# Patient Record
Sex: MALE | Race: WHITE | NOT HISPANIC OR LATINO | Employment: OTHER | URBAN - METROPOLITAN AREA
[De-identification: names, ages, dates, MRNs, and addresses within clinical notes are randomized per-mention and may not be internally consistent; named-entity substitution may affect disease eponyms.]

---

## 2021-03-09 DIAGNOSIS — Z23 NEED FOR VACCINATION: ICD-10-CM

## 2023-05-08 ENCOUNTER — HOSPITAL ENCOUNTER (OUTPATIENT)
Dept: LAB | Facility: MEDICAL CENTER | Age: 83
End: 2023-05-08
Attending: PHYSICIAN ASSISTANT
Payer: MEDICARE

## 2023-05-08 LAB
ALBUMIN SERPL BCP-MCNC: 4.2 G/DL (ref 3.2–4.9)
ALBUMIN/GLOB SERPL: 1.3 G/DL
ALP SERPL-CCNC: 87 U/L (ref 30–99)
ALT SERPL-CCNC: 11 U/L (ref 2–50)
ANION GAP SERPL CALC-SCNC: 12 MMOL/L (ref 7–16)
AST SERPL-CCNC: 19 U/L (ref 12–45)
BILIRUB SERPL-MCNC: 0.5 MG/DL (ref 0.1–1.5)
BUN SERPL-MCNC: 20 MG/DL (ref 8–22)
CALCIUM ALBUM COR SERPL-MCNC: 9.1 MG/DL (ref 8.5–10.5)
CALCIUM SERPL-MCNC: 9.3 MG/DL (ref 8.5–10.5)
CHLORIDE SERPL-SCNC: 105 MMOL/L (ref 96–112)
CHOLEST SERPL-MCNC: 146 MG/DL (ref 100–199)
CO2 SERPL-SCNC: 24 MMOL/L (ref 20–33)
CREAT SERPL-MCNC: 1 MG/DL (ref 0.5–1.4)
EST. AVERAGE GLUCOSE BLD GHB EST-MCNC: 123 MG/DL
FASTING STATUS PATIENT QL REPORTED: NORMAL
GFR SERPLBLD CREATININE-BSD FMLA CKD-EPI: 74 ML/MIN/1.73 M 2
GLOBULIN SER CALC-MCNC: 3.2 G/DL (ref 1.9–3.5)
GLUCOSE SERPL-MCNC: 95 MG/DL (ref 65–99)
HBA1C MFR BLD: 5.9 % (ref 4–5.6)
HDLC SERPL-MCNC: 33 MG/DL
LDLC SERPL CALC-MCNC: 92 MG/DL
POTASSIUM SERPL-SCNC: 4.1 MMOL/L (ref 3.6–5.5)
PROT SERPL-MCNC: 7.4 G/DL (ref 6–8.2)
SODIUM SERPL-SCNC: 141 MMOL/L (ref 135–145)
TRIGL SERPL-MCNC: 104 MG/DL (ref 0–149)

## 2023-05-08 PROCEDURE — 80053 COMPREHEN METABOLIC PANEL: CPT

## 2023-05-08 PROCEDURE — 80061 LIPID PANEL: CPT

## 2023-05-08 PROCEDURE — 83036 HEMOGLOBIN GLYCOSYLATED A1C: CPT | Mod: GA

## 2023-05-08 PROCEDURE — 36415 COLL VENOUS BLD VENIPUNCTURE: CPT | Mod: GA

## 2023-12-26 ENCOUNTER — HOSPITAL ENCOUNTER (OUTPATIENT)
Facility: MEDICAL CENTER | Age: 83
End: 2023-12-26
Attending: PHYSICIAN ASSISTANT
Payer: MEDICARE

## 2023-12-26 ENCOUNTER — OFFICE VISIT (OUTPATIENT)
Dept: URGENT CARE | Facility: PHYSICIAN GROUP | Age: 83
End: 2023-12-26
Payer: MEDICARE

## 2023-12-26 VITALS
WEIGHT: 142 LBS | OXYGEN SATURATION: 97 % | HEIGHT: 61 IN | TEMPERATURE: 98.6 F | DIASTOLIC BLOOD PRESSURE: 82 MMHG | HEART RATE: 87 BPM | BODY MASS INDEX: 26.81 KG/M2 | RESPIRATION RATE: 20 BRPM | SYSTOLIC BLOOD PRESSURE: 158 MMHG

## 2023-12-26 DIAGNOSIS — N39.0 ACUTE UTI (URINARY TRACT INFECTION): ICD-10-CM

## 2023-12-26 DIAGNOSIS — R35.0 URINARY FREQUENCY: ICD-10-CM

## 2023-12-26 DIAGNOSIS — R30.0 DYSURIA: ICD-10-CM

## 2023-12-26 LAB
APPEARANCE UR: CLEAR
BILIRUB UR STRIP-MCNC: NEGATIVE MG/DL
COLOR UR AUTO: YELLOW
GLUCOSE UR STRIP.AUTO-MCNC: NEGATIVE MG/DL
KETONES UR STRIP.AUTO-MCNC: NEGATIVE MG/DL
LEUKOCYTE ESTERASE UR QL STRIP.AUTO: NORMAL
NITRITE UR QL STRIP.AUTO: POSITIVE
PH UR STRIP.AUTO: 6 [PH] (ref 5–8)
PROT UR QL STRIP: NEGATIVE MG/DL
RBC UR QL AUTO: NEGATIVE
SP GR UR STRIP.AUTO: 1.01
UROBILINOGEN UR STRIP-MCNC: 0.2 MG/DL

## 2023-12-26 PROCEDURE — 81002 URINALYSIS NONAUTO W/O SCOPE: CPT | Performed by: PHYSICIAN ASSISTANT

## 2023-12-26 PROCEDURE — 99203 OFFICE O/P NEW LOW 30 MIN: CPT | Performed by: PHYSICIAN ASSISTANT

## 2023-12-26 PROCEDURE — 87086 URINE CULTURE/COLONY COUNT: CPT

## 2023-12-26 PROCEDURE — 3077F SYST BP >= 140 MM HG: CPT | Performed by: PHYSICIAN ASSISTANT

## 2023-12-26 PROCEDURE — 87077 CULTURE AEROBIC IDENTIFY: CPT

## 2023-12-26 PROCEDURE — 3079F DIAST BP 80-89 MM HG: CPT | Performed by: PHYSICIAN ASSISTANT

## 2023-12-26 RX ORDER — FINASTERIDE 5 MG/1
1 TABLET, FILM COATED ORAL
COMMUNITY

## 2023-12-26 RX ORDER — FLUOXETINE 10 MG/1
CAPSULE ORAL
COMMUNITY

## 2023-12-26 RX ORDER — HYDRALAZINE HYDROCHLORIDE 25 MG/1
TABLET, FILM COATED ORAL
COMMUNITY

## 2023-12-26 RX ORDER — LOSARTAN POTASSIUM 100 MG/1
100 TABLET ORAL DAILY
COMMUNITY
Start: 2023-08-28

## 2023-12-26 RX ORDER — MIRABEGRON 50 MG/1
TABLET, FILM COATED, EXTENDED RELEASE ORAL
COMMUNITY

## 2023-12-26 RX ORDER — AMIODARONE HYDROCHLORIDE 200 MG/1
1 TABLET ORAL DAILY
COMMUNITY

## 2023-12-26 RX ORDER — AMLODIPINE BESYLATE 10 MG/1
1 TABLET ORAL
COMMUNITY

## 2023-12-26 RX ORDER — MONTELUKAST SODIUM 10 MG/1
TABLET ORAL
COMMUNITY

## 2023-12-26 RX ORDER — GABAPENTIN 100 MG/1
CAPSULE ORAL
COMMUNITY

## 2023-12-26 RX ORDER — PREGABALIN 75 MG/1
CAPSULE ORAL
COMMUNITY

## 2023-12-26 RX ORDER — TAMSULOSIN HYDROCHLORIDE 0.4 MG/1
1 CAPSULE ORAL
COMMUNITY

## 2023-12-26 RX ORDER — CETIRIZINE HYDROCHLORIDE 10 MG/1
1 TABLET ORAL DAILY
COMMUNITY

## 2023-12-26 RX ORDER — DULOXETIN HYDROCHLORIDE 30 MG/1
CAPSULE, DELAYED RELEASE ORAL
COMMUNITY

## 2023-12-26 RX ORDER — LOSARTAN POTASSIUM 100 MG/1
1 TABLET ORAL
COMMUNITY

## 2023-12-26 RX ORDER — CEPHALEXIN 500 MG/1
500 CAPSULE ORAL 4 TIMES DAILY
Qty: 28 CAPSULE | Refills: 0 | Status: SHIPPED | OUTPATIENT
Start: 2023-12-26 | End: 2024-01-02

## 2023-12-26 RX ORDER — OMEPRAZOLE 20 MG/1
TABLET, DELAYED RELEASE ORAL
COMMUNITY

## 2023-12-26 NOTE — PROGRESS NOTES
Subjective     Maikel Santo is a 83 y.o. male who presents with UTI (Frequent urination,painful,x2 weeks)    PMH:  has no past medical history on file.  MEDS:   Current Outpatient Medications:     amiodarone (CORDARONE) 200 MG Tab, Take 1 Tablet by mouth every day., Disp: , Rfl:     amLODIPine (NORVASC) 10 MG Tab, Take 1 Tablet by mouth every day., Disp: , Rfl:     cetirizine (ZYRTEC) 10 MG Tab, Take 1 Tablet by mouth every day., Disp: , Rfl:     DULoxetine (CYMBALTA) 30 MG Cap DR Particles, duloxetine 30 mg capsule,delayed release  TAKE 1 CAPSULE BY MOUTH EVERY DAY, Disp: , Rfl:     finasteride (PROSCAR) 5 MG Tab, Take 1 Tablet by mouth every day., Disp: , Rfl:     FLUoxetine (PROZAC) 10 MG Cap, TAKE 1 CAPSULE BY ORAL ROUTE EVERY DAY FOR STOP VENLAFAXINE, Disp: , Rfl:     gabapentin (NEURONTIN) 100 MG Cap, , Disp: , Rfl:     losartan (COZAAR) 100 MG Tab, Take 1 Tablet by mouth every day., Disp: , Rfl:     hydrALAZINE (APRESOLINE) 25 MG Tab, TAKE 1 TABLET BY MOUTH 3 TIMES A DAY EVERY DAY WITH FOOD, Disp: , Rfl:     losartan (COZAAR) 100 MG Tab, Take 100 mg by mouth every day., Disp: , Rfl:     Mirabegron ER (MYRBETRIQ) 50 MG TABLET SR 24 HR, Myrbetriq 50 mg tablet,extended release  Take 1 tablet every day by oral route for 21 days., Disp: , Rfl:     montelukast (SINGULAIR) 10 MG Tab, TAKE 1 TABLET BY ORAL ROUTE EVERY DAY IN THE EVENING, Disp: , Rfl:     omeprazole (PRILOSEC OTC) 20 MG tablet, omeprazole 20 mg tablet,delayed release, Disp: , Rfl:     pregabalin (LYRICA) 75 MG Cap, TAKE 1 CAPSULE (75 MG TOTAL) BY MOUTH 3 (THREE) TIMES A DAY FOR 30 DAYS, Disp: , Rfl:     tamsulosin (FLOMAX) 0.4 MG capsule, Take 1 Capsule by mouth at bedtime., Disp: , Rfl:   ALLERGIES: No Known Allergies  SURGHX: History reviewed. No pertinent surgical history.  SOCHX:    FH: Reviewed with patient, not pertinent to this visit.           Patient presents with complaint of dysuria and increasingly frequent urination x 2 weeks. PT  "performs self cath for urination, definitely more frequent.  PT denies fever or chills, n/v or flank pain.  PT states symptoms similar to last UTI.      PT son is here, assisting with interpretation, was offered iPad , politely declined.     UTI  This is a new problem. The current episode started 1 to 4 weeks ago (2 weeks). The problem has been gradually worsening. Associated symptoms include urinary symptoms. Pertinent negatives include no fever or nausea. Nothing aggravates the symptoms. He has tried nothing for the symptoms. The treatment provided no relief.       Review of Systems   Constitutional:  Negative for fever.   Gastrointestinal:  Negative for nausea.   Genitourinary:  Positive for dysuria and frequency. Negative for flank pain and hematuria.   All other systems reviewed and are negative.             Objective     BP (!) 158/82 (BP Location: Left arm, Patient Position: Sitting, BP Cuff Size: Adult long)   Pulse 87   Temp 37 °C (98.6 °F) (Temporal)   Resp 20   Ht 1.549 m (5' 1\")   Wt 64.4 kg (142 lb)   SpO2 97%   BMI 26.83 kg/m²      Physical Exam  Vitals and nursing note reviewed.   Constitutional:       General: He is not in acute distress.     Appearance: Normal appearance. He is well-developed and normal weight. He is not toxic-appearing.   HENT:      Head: Normocephalic and atraumatic.      Nose: Nose normal.      Mouth/Throat:      Mouth: Mucous membranes are moist.   Eyes:      Extraocular Movements: Extraocular movements intact.      Conjunctiva/sclera: Conjunctivae normal.      Pupils: Pupils are equal, round, and reactive to light.   Cardiovascular:      Rate and Rhythm: Normal rate and regular rhythm.      Pulses: Normal pulses.      Heart sounds: Normal heart sounds.   Pulmonary:      Effort: Pulmonary effort is normal.      Breath sounds: Normal breath sounds.   Abdominal:      General: Bowel sounds are normal.      Palpations: Abdomen is soft.      Tenderness: There is no " guarding or rebound.   Musculoskeletal:         General: Normal range of motion.      Cervical back: Normal range of motion and neck supple.   Skin:     General: Skin is warm and dry.      Capillary Refill: Capillary refill takes less than 2 seconds.   Neurological:      General: No focal deficit present.      Mental Status: He is alert and oriented to person, place, and time.      Gait: Gait normal.   Psychiatric:         Mood and Affect: Mood normal.         Behavior: Behavior is cooperative.                             Assessment & Plan              1. Urinary frequency  POCT Urinalysis    URINE CULTURE(NEW)    cephALEXin (KEFLEX) 500 MG Cap      2. Dysuria  POCT Urinalysis    URINE CULTURE(NEW)    cephALEXin (KEFLEX) 500 MG Cap      3. Acute UTI (urinary tract infection)  cephALEXin (KEFLEX) 500 MG Cap        PT HPI and PE consistent with acute cystitis.  I will treat initiate treatment with keflex which pt has had in past with good effect .     Culture sent to lab, will call with any necessary treatment or treatment changes.     Differential diagnosis, supportive care, and indications for immediate follow-up discussed with patient.  Instructed to return to clinic or nearest emergency department for any change in condition, further concerns, or worsening of symptoms.    I personally reviewed prior external notes and test results pertinent to today's visit.  I have independently reviewed and interpreted all diagnostics ordered during this urgent care visit.    PT should follow up with PCP in 1-2 days for re-evaluation if symptoms have not improved.      Discussed red flags and reasons to return to UC or ED.      Pt and/or family verbalized understanding of diagnosis and follow up instructions and was offered informational handout on diagnosis.  PT discharged.     Please note that this dictation was created using voice recognition software. I have made every reasonable attempt to correct obvious errors, but I  expect that there may be errors of grammar and possibly content that I did not discover before finalizing the note.

## 2023-12-28 LAB
BACTERIA UR CULT: NORMAL
SIGNIFICANT IND 70042: NORMAL
SITE SITE: NORMAL
SOURCE SOURCE: NORMAL

## 2023-12-29 ASSESSMENT — ENCOUNTER SYMPTOMS
NAUSEA: 0
FLANK PAIN: 0
FEVER: 0

## 2024-05-16 ENCOUNTER — HOSPITAL ENCOUNTER (OUTPATIENT)
Facility: MEDICAL CENTER | Age: 84
End: 2024-05-16
Attending: PHYSICIAN ASSISTANT
Payer: MEDICARE

## 2024-05-16 ENCOUNTER — OFFICE VISIT (OUTPATIENT)
Dept: URGENT CARE | Facility: PHYSICIAN GROUP | Age: 84
End: 2024-05-16
Payer: MEDICARE

## 2024-05-16 VITALS
BODY MASS INDEX: 28.71 KG/M2 | SYSTOLIC BLOOD PRESSURE: 142 MMHG | HEART RATE: 86 BPM | OXYGEN SATURATION: 98 % | RESPIRATION RATE: 18 BRPM | WEIGHT: 156 LBS | DIASTOLIC BLOOD PRESSURE: 82 MMHG | HEIGHT: 62 IN | TEMPERATURE: 98.6 F

## 2024-05-16 DIAGNOSIS — N39.0 URINARY TRACT INFECTION WITHOUT HEMATURIA, SITE UNSPECIFIED: ICD-10-CM

## 2024-05-16 DIAGNOSIS — R10.9 LEFT FLANK PAIN: ICD-10-CM

## 2024-05-16 LAB
APPEARANCE UR: NORMAL
BILIRUB UR STRIP-MCNC: NEGATIVE MG/DL
COLOR UR AUTO: NORMAL
GLUCOSE UR STRIP.AUTO-MCNC: NEGATIVE MG/DL
KETONES UR STRIP.AUTO-MCNC: NEGATIVE MG/DL
LEUKOCYTE ESTERASE UR QL STRIP.AUTO: NORMAL
NITRITE UR QL STRIP.AUTO: NEGATIVE
PH UR STRIP.AUTO: 6 [PH] (ref 5–8)
PROT UR QL STRIP: NEGATIVE MG/DL
RBC UR QL AUTO: NORMAL
SP GR UR STRIP.AUTO: 1.02
UROBILINOGEN UR STRIP-MCNC: 0.2 MG/DL

## 2024-05-16 PROCEDURE — 99214 OFFICE O/P EST MOD 30 MIN: CPT | Performed by: PHYSICIAN ASSISTANT

## 2024-05-16 PROCEDURE — 3079F DIAST BP 80-89 MM HG: CPT | Performed by: PHYSICIAN ASSISTANT

## 2024-05-16 PROCEDURE — 3077F SYST BP >= 140 MM HG: CPT | Performed by: PHYSICIAN ASSISTANT

## 2024-05-16 PROCEDURE — 81002 URINALYSIS NONAUTO W/O SCOPE: CPT | Performed by: PHYSICIAN ASSISTANT

## 2024-05-16 ASSESSMENT — ENCOUNTER SYMPTOMS
ABDOMINAL PAIN: 1
VOMITING: 0
SHORTNESS OF BREATH: 0
DIARRHEA: 0
NAUSEA: 0
FLANK PAIN: 0
FEVER: 0
EYE DISCHARGE: 0
EYE REDNESS: 0
CONSTIPATION: 0

## 2024-05-16 ASSESSMENT — FIBROSIS 4 INDEX: FIB4 SCORE: 2.53

## 2024-05-16 NOTE — PROGRESS NOTES
Subjective     Maikel Santo is a 84 y.o. male who presents with LUQ Pain (X 3 days )            HPI  This is a new problem.   The patient presents to clinic accompanied by his son-in-law who helps translate throughout the duration of today's encounter.  The patient presents to clinic complaining of left side pain x 4 days.  The patient states the pain is located to his left side involving the left ribs and left upper abdomen.  The patient reports no recent injury or trauma to his left side.  The patient states this pain is intermittent.  The patient reports no associated swelling, bruising, redness, or rashes/lesions.  He also reports no associated fever.  The patient denies nausea, vomiting, and diarrhea.  The patient states he is having regular bowel movements.  The patient reports no urinary symptoms.  No dysuria.  No hematuria.  The patient denies chest pain and shortness of breath.  The patient states his pain is worse with certain movements.  The patient has applied a topical oil, but has not taken any OTC medications for his current symptoms.  The patient reports no history of same.    PMH:  has no past medical history on file.  MEDS:   Current Outpatient Medications:     amiodarone (CORDARONE) 200 MG Tab, Take 1 Tablet by mouth every day., Disp: , Rfl:     amLODIPine (NORVASC) 10 MG Tab, Take 1 Tablet by mouth every day., Disp: , Rfl:     DULoxetine (CYMBALTA) 30 MG Cap DR Particles, duloxetine 30 mg capsule,delayed release  TAKE 1 CAPSULE BY MOUTH EVERY DAY, Disp: , Rfl:     FLUoxetine (PROZAC) 10 MG Cap, TAKE 1 CAPSULE BY ORAL ROUTE EVERY DAY FOR STOP VENLAFAXINE, Disp: , Rfl:     gabapentin (NEURONTIN) 100 MG Cap, , Disp: , Rfl:     losartan (COZAAR) 100 MG Tab, Take 1 Tablet by mouth every day., Disp: , Rfl:     hydrALAZINE (APRESOLINE) 25 MG Tab, TAKE 1 TABLET BY MOUTH 3 TIMES A DAY EVERY DAY WITH FOOD, Disp: , Rfl:     losartan (COZAAR) 100 MG Tab, Take 100 mg by mouth every day., Disp: , Rfl:      "Mirabegron ER (MYRBETRIQ) 50 MG TABLET SR 24 HR, Myrbetriq 50 mg tablet,extended release  Take 1 tablet every day by oral route for 21 days., Disp: , Rfl:     montelukast (SINGULAIR) 10 MG Tab, TAKE 1 TABLET BY ORAL ROUTE EVERY DAY IN THE EVENING, Disp: , Rfl:     omeprazole (PRILOSEC OTC) 20 MG tablet, omeprazole 20 mg tablet,delayed release, Disp: , Rfl:     pregabalin (LYRICA) 75 MG Cap, TAKE 1 CAPSULE (75 MG TOTAL) BY MOUTH 3 (THREE) TIMES A DAY FOR 30 DAYS, Disp: , Rfl:     tamsulosin (FLOMAX) 0.4 MG capsule, Take 1 Capsule by mouth at bedtime., Disp: , Rfl:     cetirizine (ZYRTEC) 10 MG Tab, Take 1 Tablet by mouth every day. (Patient not taking: Reported on 5/16/2024), Disp: , Rfl:     finasteride (PROSCAR) 5 MG Tab, Take 1 Tablet by mouth every day. (Patient not taking: Reported on 5/16/2024), Disp: , Rfl:   ALLERGIES: No Known Allergies  SURGHX: No past surgical history on file.  SOCHX:  reports that he has never smoked. He has never used smokeless tobacco.  FH: Family history was reviewed, no pertinent findings to report    Review of Systems   Constitutional:  Negative for fever.   Eyes:  Negative for discharge and redness.   Respiratory:  Negative for shortness of breath.    Cardiovascular:  Negative for chest pain.   Gastrointestinal:  Positive for abdominal pain. Negative for constipation, diarrhea, nausea and vomiting.   Genitourinary:  Negative for dysuria, flank pain, frequency, hematuria and urgency.              Objective     BP (!) 142/82 (BP Location: Left arm, Patient Position: Sitting, BP Cuff Size: Adult)   Pulse 86   Temp 37 °C (98.6 °F) (Temporal)   Resp 18   Ht 1.575 m (5' 2\")   Wt 70.8 kg (156 lb)   SpO2 98%   BMI 28.53 kg/m²      Physical Exam  Constitutional:       General: He is not in acute distress.     Appearance: Normal appearance. He is well-developed. He is not ill-appearing.   HENT:      Head: Normocephalic and atraumatic.      Right Ear: External ear normal.      Left Ear: " External ear normal.   Eyes:      Extraocular Movements: Extraocular movements intact.      Conjunctiva/sclera: Conjunctivae normal.   Cardiovascular:      Rate and Rhythm: Normal rate and regular rhythm.      Heart sounds: Normal heart sounds.   Pulmonary:      Effort: Pulmonary effort is normal. No respiratory distress.      Breath sounds: Normal breath sounds. No wheezing.   Abdominal:      General: Bowel sounds are normal.      Palpations: Abdomen is soft.      Tenderness: There is no abdominal tenderness. There is left CVA tenderness. There is no right CVA tenderness, guarding or rebound.   Musculoskeletal:         General: Normal range of motion.      Cervical back: Normal range of motion and neck supple.   Skin:     General: Skin is warm and dry.   Neurological:      Mental Status: He is alert and oriented to person, place, and time.               Progress:  Results for orders placed or performed in visit on 05/16/24   POCT Urinalysis   Result Value Ref Range    POC Color DARK YELLOW Negative    POC Appearance SLIGHTLY CLOUDY Negative    POC Glucose NEGATIVE Negative mg/dL    POC Bilirubin NEGATIVE Negative mg/dL    POC Ketones NEGATIVE Negative mg/dL    POC Specific Gravity 1.020 <1.005 - >1.030    POC Blood TRACE Negative    POC Urine PH 6.0 5.0 - 8.0    POC Protein NEGATIVE Negative mg/dL    POC Urobiligen 0.2 Negative (0.2) mg/dL    POC Nitrites NEGATIVE Negative    POC Leukocyte Esterase MODERATE Negative         Urine Culture - pending     CT Renal Colic:  COMPARISON: None.     FINDINGS:  Renal stone: No urinary tract calculus identified. No hydronephrosis.     1.1 x 3.1 cm bladder stone measuring 577 Hounsfield units in density.     Lung Bases:     Lung base atelectasis. No pleural or pericardial fluid.     Cardiomegaly.  Calcifications aortic valve.  Coronary artery calcifications.     Abdomen:     Within the limits of noncontrast technique:     Liver: Normal.     Spleen: Unremarkable.     Pancreas:  Unremarkable.     Gallbladder: No calcified stones.     Biliary: No biliary dilatation..     Adrenal glands: Nonenlarged.     Bowel: No evidence of bowel obstruction or acute appendicitis.     Lymph nodes: No adenopathy.     Vasculature: Moderate calcified plaque within the aorta.     Peritoneum: Unremarkable without ascites.     Musculoskeletal: No aggressive osseous lesion.     Pelvis:     No obvious abnormality seen in the pelvic structures but evaluation limited on CT.     There are numerous perirectal space lymph nodes measuring up to 8.3 mm short axis.     The urinary bladder is partially decompressed.     There is a 1.1 x 3.1 cm bladder stone measuring 577 Hounsfield units in density.     The prostate measures 2.8 x 4.7 cm.  Prostate seed markers are demonstrated..     No aggressive bone lesions are seen.     Moderate left curvature of the lumbar spine and degenerative changes.     IMPRESSION:  1. No urinary tract calculus identified. No renal or ureteral stones.     2. 1.1 x 3.1 cm bladder stone.     3. Numerous perirectal lymph nodes measuring up to 8.3 mm short axis.     4. No evidence of bowel obstruction or acute appendicitis. No free fluid.     5. Coronary artery calcifications.             Assessment & Plan        1. Left flank pain  - POCT Urinalysis  - CT-RENAL COLIC EVALUATION(A/P W/O); Future  - URINE CULTURE(NEW); Future    2. Urinary tract infection without hematuria, site unspecified  - cephALEXin (KEFLEX) 500 MG Cap; Take 1 Capsule by mouth 4 times a day for 7 days.  Dispense: 28 Capsule; Refill: 0      The patient's presenting symptoms and physical exam findings are consistent with left flank pain.  The patient presents to clinic complaining of pain to his left side involving the left ribs and left upper abdomen.  On physical exam, the patient had left-sided CVA tenderness.  The patient's abdomen was soft and nontender with normal bowel sounds.  The remainder the patient physical exam today in  clinic was normal.  The patient had no skin changes to the affected area.  The patient is nontoxic and appears in no acute distress.  The patient's vital signs are stable and within normal limits.  He is afebrile today in clinic.  The patient's POCT urinalysis today in clinic showed moderate leukocyte esterase and trace intact blood.  Will culture the patient's urine to rule out a possible bacterial infection.  Will also order a CT renal colic to further evaluate the patient's left leg pain.  The patient's CT renal colic showed no urinary tract calculus or renal/ureteral stones.  A 1.1 x 3.1 cm bladder stone was noted.  The patient was also found to have numerous perirectal lymph nodes measuring up to 8.3 mm short axis.  There was no evidence of bowel obstruction or acute appendicitis.  No free fluid was noted.  The patient did have coronary artery calcifications.  Based on the patient's presenting symptoms, POCT urinalysis results, and CT findings, will prescribe the patient cephalexin for presumed urinary tract infection.  Advised the patient and his son-in-law to monitor for worsening signs or symptoms.  Instructed the patient to follow-up with primary care, especially regarding the numerous perirectal lymph nodes.  The patient's son-in-law verbalized understanding.  Recommend OTC medications and supportive care for symptomatic management.  Recommend the patient follow-up with primary care as above.  Discussed return precautions with the patient and his son-in-law, and they verbalized understanding.    Differential diagnoses, supportive care, and indications for immediate follow-up discussed with patient.   Instructed to return to clinic or nearest emergency department for any change in condition, further concerns, or worsening of symptoms.      OTC Tylenol or Motrin for fever/discomfort.  Drink plenty of fluids  Follow-up with PCP  Return to clinic or go to the ED if symptoms worsen or fail to improve, or if the  patient should develop worsening/increasing urinary symptoms, hematuria, flank pain, abdominal pain, nausea/vomiting, fever/chills, and/or any concerning symptoms.    Discussed plan with the patient and his enema, and they agree with the above.    I personally reviewed prior external notes and test results pertinent to today's visit.  I have independently reviewed and interpreted all diagnostics ordered during this urgent care visit.     Please note that this dictation was created using voice recognition software. I have made every reasonable attempt to correct obvious errors, but I expect that there may be errors of grammar and possibly content that I did not discover before finalizing the note.     This note was electronically signed by Lizeth Erickson PA-C

## 2024-05-17 ENCOUNTER — TELEPHONE (OUTPATIENT)
Dept: URGENT CARE | Facility: PHYSICIAN GROUP | Age: 84
End: 2024-05-17
Payer: MEDICARE

## 2024-05-17 ENCOUNTER — HOSPITAL ENCOUNTER (OUTPATIENT)
Dept: RADIOLOGY | Facility: MEDICAL CENTER | Age: 84
End: 2024-05-17
Attending: PHYSICIAN ASSISTANT
Payer: MEDICARE

## 2024-05-17 DIAGNOSIS — R10.9 LEFT FLANK PAIN: ICD-10-CM

## 2024-05-17 RX ORDER — CEPHALEXIN 500 MG/1
500 CAPSULE ORAL 4 TIMES DAILY
Qty: 28 CAPSULE | Refills: 0 | Status: SHIPPED | OUTPATIENT
Start: 2024-05-17 | End: 2024-05-24

## 2024-05-17 NOTE — TELEPHONE ENCOUNTER
5/17/2024     Spoke with the patient's son-in-law regarding the results of his CT scan.  Advised the patient to follow-up with primary care regarding the incidental perirectal lymph nodes.   The patient's son-in-law had no further questions at this time.

## 2024-05-19 LAB
BACTERIA UR CULT: NORMAL
SIGNIFICANT IND 70042: NORMAL
SITE SITE: NORMAL
SOURCE SOURCE: NORMAL